# Patient Record
Sex: MALE | Race: WHITE | NOT HISPANIC OR LATINO
[De-identification: names, ages, dates, MRNs, and addresses within clinical notes are randomized per-mention and may not be internally consistent; named-entity substitution may affect disease eponyms.]

---

## 2019-07-03 PROBLEM — Z00.00 ENCOUNTER FOR PREVENTIVE HEALTH EXAMINATION: Status: ACTIVE | Noted: 2019-07-03

## 2019-07-09 ENCOUNTER — APPOINTMENT (OUTPATIENT)
Dept: OTOLARYNGOLOGY | Facility: CLINIC | Age: 34
End: 2019-07-09
Payer: COMMERCIAL

## 2019-07-09 DIAGNOSIS — Q38.5 CONGENITAL MALFORMATIONS OF PALATE, NOT ELSEWHERE CLASSIFIED: ICD-10-CM

## 2019-07-09 DIAGNOSIS — Z87.891 PERSONAL HISTORY OF NICOTINE DEPENDENCE: ICD-10-CM

## 2019-07-09 PROCEDURE — 31575 DIAGNOSTIC LARYNGOSCOPY: CPT

## 2019-07-09 PROCEDURE — 99203 OFFICE O/P NEW LOW 30 MIN: CPT | Mod: 25

## 2019-07-09 NOTE — ASSESSMENT
[FreeTextEntry1] : Clinically stable. There is no evidence of the lesion.\par I suspect that he had some pelvic trauma that has resolved.\par A low grade dysphagia may be either from globus pharyngeus, L. SD or psychosomatic. I reassured them I did not see any evidence of a lesion.\par \par As for the weight loss that may be unexplained I did explain him if this persists she needs to be evaluated by his primary care physician for comprehensive exam.\par \par I did once again recommend smoking cessation.

## 2019-07-09 NOTE — PROCEDURE
[de-identified] : PROCEDURE: Flexible laryngoscopy\par SURGEON: Dr. Rebolledo\par INDICATIONS: He was unable to tolerate a mirror exam. Assess for laryngopharynx pharyngeal reflux. cough. head and neck mass. \par ANESTHESIA: The patient was placed in a sitting position.  Following application of the topical anesthetic and decongestant, exam was performed with a flexible scope.  The scope was passed along the right nasal floor to the nasopharynx.  It was then passed into the region of the middle meatus, middle turbinate, and sphenoethmoid region.  An identical procedure was performed on the left side.  The following findings were noted:\par \par The nasal musoca was healthy appearing and the septum was roughly midline. The middle meatus and sphenoethmoid recesses were clear bilaterally. The nasopharynx \par \par Nasopharynx: no masses, choanae patent, no adenoid tissue\par \par Base of tongue/vallecula: no masses or asymmetry\par Pharyngeal walls: symmetrical. No masses\par Pyriform sinuses: no lesions or pooling of secretions\par Epiglottis: normal. No edema or lesions\par Aryepiglottic folds: normal. No lesions. \par Vocal cords: clear and mobile. No lesions. Airway patent\par Arytenoids: no edema or erythema \par Interarytenoid area: no edema, erythema or lesion.\par

## 2019-07-09 NOTE — HISTORY OF PRESENT ILLNESS
[de-identified] : This is an initial office visit for this gentleman who is concern for oral cavity and throat pathology.\par He reports that he is an 8 "social smoker" for the last 15 years.\par Approximately 3 weeks ago he began using E. cigarette. After using it once he developed very significant pelvic pain. He also describe seeing some lesions. Those have resolved.\par He does continue to complain of a low-grade dysphasia and a sore throat.\par He has some concern that he may have a malignancy.\par \par He also reports that he is noticed a 1 pound weight loss each day for the last 4 days. He does however feel that this is because he is eating healthier.

## 2020-08-13 ENCOUNTER — APPOINTMENT (OUTPATIENT)
Dept: OTOLARYNGOLOGY | Facility: CLINIC | Age: 35
End: 2020-08-13
Payer: COMMERCIAL

## 2020-08-13 VITALS — TEMPERATURE: 97.7 F | BODY MASS INDEX: 26.46 KG/M2 | WEIGHT: 189 LBS | HEIGHT: 71 IN

## 2020-08-13 DIAGNOSIS — J34.2 DEVIATED NASAL SEPTUM: ICD-10-CM

## 2020-08-13 DIAGNOSIS — Z87.09 PERSONAL HISTORY OF OTHER DISEASES OF THE RESPIRATORY SYSTEM: ICD-10-CM

## 2020-08-13 DIAGNOSIS — K21.9 GASTRO-ESOPHAGEAL REFLUX DISEASE W/OUT ESOPHAGITIS: ICD-10-CM

## 2020-08-13 DIAGNOSIS — J31.0 CHRONIC RHINITIS: ICD-10-CM

## 2020-08-13 PROCEDURE — 99214 OFFICE O/P EST MOD 30 MIN: CPT | Mod: 25

## 2020-08-13 PROCEDURE — 31231 NASAL ENDOSCOPY DX: CPT

## 2020-08-13 NOTE — ASSESSMENT
[FreeTextEntry1] : It was my impression that he has a rhinitis and the septal deflection. The option of further intervention was discussed but not necessarily recommended.\par \par It was my impression is that the patient's symptoms were from laryngeal evidence of reflux.  I reviewed an anti-reflux diet at length with the patient.  I recommended a course of famotidine 40 mg at bedtime.  I suggested a repeat evaluation in 4-6 weeks to make sure that the patient is improving.  If not I would recommend further evaluation including possibly pH testing, PPI therapy and/or further GI evaluation.\par He had an upper GI evaluation for this a couple of years ago after which she was treated with Prilosec and had symptomatic improvement

## 2020-08-13 NOTE — PHYSICAL EXAM
[FreeTextEntry1] : \par The patient was alert and oriented and in no distress.\par Voice was clear.\par \par Face:\par The patient had no facial asymmetry or mass.\par The skin was unremarkable.\par \par Eyes:\par The pupils were equal round and reactive to light and accommodation.\par There was no significant nystagmus or disconjugate gaze noted.\par \par Nose: \par The external nose had no significant deformity.  There was no facial tenderness.  On anterior rhinoscopy, the nasal mucosa was clear.  The anterior septum was midline.  There were no visualized polyps purulence  or masses.\par \par Oral cavity:\par The oral mucosa was normal.\par The oral and base of tongue were clear and without mass.\par The gingival and buccal mucosa were moist and without lesions.\par The palate moved well.\par There was no cleft to the palate.\par There appeared to be good salivary flow.  \par There was no pus, erythema or mass in the oral cavity.\par \par He has 3+ noninjected tonsils\par \par \par Ears:\par The external ears were normal without deformity.\par The ear canals were clear.\par The tympanic membranes were intact and normal.\par \par Neck: \par The neck was symmetrical.\par The parotid and submandibular glands were normal without masses.\par The trachea was midline and there was no unusual crepitus.\par The thyroid was smooth and nontender and no masses were palpated.\par There was no significant cervical adenopathy.\par \par \par Neuro:\par Neurologically, the patient was awake, alert, and oriented to person, place and time. There were no obvious focal neurologic abnormalities.  Cranial nerves II through XII were grossly intact.\par \par \par TMJ:\par The temporomandibular joints were nontender.\par There was no abnormal crepitus and no significant malocclusion\par \par  [de-identified] : The area of his maximal discomfort is the posterior page of the right-sided thyroid cartilage at the level of the arytenoids.\par \par Nasal endoscopy: \par CPT 31836\par Procedure Note:\par \par Endoscopy was done with Covid precautions and with video. All risks and benefits were discussed with the patient and consent obtained.\par \par Nasal endoscopy was done with topical anesthesia of Pontocaine and Afrin and a      nasal endoscope.\par Indication: Nasal congestion, rule out sinusitis.\par Procedure: The nasal cavity was anesthetized with topical Afrin and Pontocaine. An  endoscope was used and inserted into the nasal cavity.\par Attention was first paid to the anterior nasal cavity.\par Endocoscopy was performed to inspect the interior of the nasal cavity, the nasal septum,  the middle and superior meati, the inferior, middle and superior turbinates, and the spheno-ethmoidal  recesses, the nasopharynx and eustachian tube orifices bilaterally. \par All findings were normal except:\par \par Mucosa slightly boggy with an S-shaped deflection.\par \par Flexible fiberoptic laryngoscopy: CPT 65102\par Indications: Dysphagia\par Procedure note:\par \par Flexible fiberoptic laryngoscopy was performed because of dysphagia and because of the patient's inability to tolerate adequate mirror examination.\par \par The nasal cavity was anesthetized with Pontocaine and Afrin.\par The flexible endoscope was placed into the patient's nasal cavity.\par The nasopharynx was without masses.\par The oropharynx, vallecula and base of tongue had no masses.\par The epiglottis, aryepiglottic folds and false vocal cords were normal.\par The pyriform sinuses were without mucosal lesions or pooling of secretions.  \par The laryngeal ventricles were without lesions.\par The visualized subglottis was without mass.\par The lateral and posterior pharyngeal walls were clear and symmetrical.\par The vocal folds were clear and mobile; they abducted and adducted normally.\par There was no interarytenoid mass or fullness.\par \par Endoscopy was done with Covid precautions and with video. All risks and benefits were discussed with the patient and consent obtained.\par \par There was interarytenoid erythema, worse on the right arytenoid which was the area of his discomfort

## 2020-08-13 NOTE — HISTORY OF PRESENT ILLNESS
[de-identified] : NO GARCIA is a 34 year old male who comes in complaining of 8 days of a right-sided persistent sore throat. This started while he was on vacation and he was drinking more than usual. He notes a little bit of hoarseness he does use his voice quite a bit. He denies significant reflux.   The patient had no other ear nose or throat complaints at this visit.

## 2020-12-23 PROBLEM — Z87.09 HISTORY OF SORE THROAT: Status: RESOLVED | Noted: 2019-07-09 | Resolved: 2020-12-23

## 2021-10-28 ENCOUNTER — NON-APPOINTMENT (OUTPATIENT)
Age: 36
End: 2021-10-28

## 2021-11-01 ENCOUNTER — APPOINTMENT (OUTPATIENT)
Dept: OTOLARYNGOLOGY | Facility: CLINIC | Age: 36
End: 2021-11-01
Payer: COMMERCIAL

## 2021-11-01 VITALS — WEIGHT: 190 LBS | BODY MASS INDEX: 26.6 KG/M2 | TEMPERATURE: 96.9 F | HEIGHT: 71 IN

## 2021-11-01 DIAGNOSIS — H93.293 OTHER ABNORMAL AUDITORY PERCEPTIONS, BILATERAL: ICD-10-CM

## 2021-11-01 DIAGNOSIS — M26.629 ARTHRALGIA OF TEMPOROMANDIBULAR JOINT,: ICD-10-CM

## 2021-11-01 DIAGNOSIS — H93.13 TINNITUS, BILATERAL: ICD-10-CM

## 2021-11-01 PROCEDURE — 99213 OFFICE O/P EST LOW 20 MIN: CPT | Mod: 25

## 2021-11-01 PROCEDURE — 92557 COMPREHENSIVE HEARING TEST: CPT

## 2021-11-01 PROCEDURE — 92550 TYMPANOMETRY & REFLEX THRESH: CPT

## 2021-11-01 PROCEDURE — 92504 EAR MICROSCOPY EXAMINATION: CPT

## 2021-11-01 RX ORDER — FAMOTIDINE 40 MG/1
40 TABLET, FILM COATED ORAL
Qty: 30 | Refills: 5 | Status: DISCONTINUED | COMMUNITY
Start: 2020-08-13 | End: 2021-11-01

## 2021-11-01 RX ORDER — APREMILAST 30 MG/1
TABLET, FILM COATED ORAL
Refills: 0 | Status: ACTIVE | COMMUNITY

## 2021-11-01 RX ORDER — METHYLPREDNISOLONE 4 MG/1
4 TABLET ORAL
Qty: 21 | Refills: 0 | Status: DISCONTINUED | COMMUNITY
Start: 2021-07-22

## 2021-11-01 NOTE — DATA REVIEWED
[de-identified] : In order to investigate current symptoms, Complete audiometry was ordered and completed today. I have interpreted these results and reviewed them in detail with the patient.\par \par

## 2021-11-01 NOTE — ASSESSMENT
[FreeTextEntry1] : Mild tinnitus, may be related to asymmetric. Hearing levels.  I have reviewed the audiometry in detail with the patient which reveals a slight hearing loss in the left ear.\par \par Noise protection, recommended. Monitoring recommended with repeat testing in 12 months and as needed . If changes occur. Either hygiene reviewed.\par \par Symptoms of bruxism and headache in the recent past, consistent with TMJ dysfunction. I have suggested an oral surgery consultation.

## 2021-11-01 NOTE — HISTORY OF PRESENT ILLNESS
[de-identified] : NO GARCIA has a history of intermittent tinnitus in both ear and expresses sensitivity to loud noises.  Wave or flutter sensation for a few seconds randomly noted.  There is a history of bruxism with jaw sensitivity, and headache.\par \par Denies noise exposure. \par

## 2024-01-18 ENCOUNTER — APPOINTMENT (OUTPATIENT)
Dept: OTOLARYNGOLOGY | Facility: CLINIC | Age: 39
End: 2024-01-18

## 2024-01-23 ENCOUNTER — APPOINTMENT (OUTPATIENT)
Dept: OTOLARYNGOLOGY | Facility: CLINIC | Age: 39
End: 2024-01-23
Payer: COMMERCIAL

## 2024-01-23 DIAGNOSIS — R43.8 OTHER DISTURBANCES OF SMELL AND TASTE: ICD-10-CM

## 2024-01-23 DIAGNOSIS — R07.0 PAIN IN THROAT: ICD-10-CM

## 2024-01-23 PROCEDURE — 31575 DIAGNOSTIC LARYNGOSCOPY: CPT

## 2024-01-23 PROCEDURE — 99203 OFFICE O/P NEW LOW 30 MIN: CPT | Mod: 25

## 2024-01-23 PROCEDURE — 99213 OFFICE O/P EST LOW 20 MIN: CPT | Mod: 25

## 2024-01-23 NOTE — PHYSICAL EXAM
[TextEntry] : PHYSICAL EXAM  General: The patient was alert and oriented and in no distress. Voice was clear.  Eyes: The patient was alert, oriented and in no distress. Voice was clear.  Eyes: Ocular motility normal. No proptosis. Conjunctiva normal. Sclera white. There was no significant nystagmus or disconjugate gaze noted.  Face: The patient had no facial asymmetry or mass. The skin was unremarkable.  Ears: External ears were normal without deformity. Ear canals were clear Tympanic membranes were intact and normal. No perforation or effusion  Nose:  The external nose had no significant deformity.  There was no facial tenderness.  On anterior rhinoscopy, the nasal mucosa was healthy in appearance. The anterior septum was midline.  There were no visualized polyps purulence  or masses. See procedure note for endonasal exam.  Oral cavity: Oral mucosa- normal. Oral and base of tongue- clear and without mass. Gingival and buccal mucosa- moist and without lesions. Palate- the palate moved well. There was no cleft palate. There appeared to be good salivary flow.   Oral cavity/oropharynx- no pus, erythema or mass.  Neck:  The neck was symmetrical. The parotid and submandibular glands were normal without masses. The trachea was midline and there was no unusual crepitus. Thyroid was smooth and nontender and no masses were palpated. Cervical adenopathy- none.

## 2024-01-23 NOTE — ASSESSMENT
[FreeTextEntry1] : No lesion or infection. Likely has LPR particularly in light of previous diagnosis. Patient education material provided. Follow-up if the problem persist or progresses.

## 2024-01-23 NOTE — PROCEDURE
[de-identified] : PROCEDURE: Flexible laryngoscopy SURGEON: Dr. Rebolledo INDICATIONS: He was unable to tolerate a mirror exam. Assess for laryngopharynx pharyngeal reflux. cough. head and neck mass.  ANESTHESIA: The patient was placed in a sitting position.  Following application of the topical anesthetic and decongestant, exam was performed with a flexible scope.  The scope was passed along the right nasal floor to the nasopharynx.  It was then passed into the region of the middle meatus, middle turbinate, and sphenoethmoid region.  An identical procedure was performed on the left side.  The following findings were noted:  The nasal musoca was healthy appearing and the septum was roughly midline. The middle meatus and sphenoethmoid recesses were clear bilaterally. The nasopharynx   Nasopharynx: no masses, choanae patent, no adenoid tissue  Base of tongue/vallecula: no masses or asymmetry Pharyngeal walls: symmetrical. No masses. Pyriform sinuses: no lesions or pooling of secretions. Epiglottis: normal. No edema or lesions. Aryepiglottic folds: normal. No lesions.  Vocal cords: clear and mobile. No lesions. Airway patent. Arytenoids: no edema or erythema.  Interarytenoid area: no edema, erythema or lesion.

## 2024-08-23 NOTE — HISTORY OF PRESENT ILLNESS
[de-identified] : Initial visit. He reports that he was in his otherwise state of fine health when he had a recent upper respiratory illness.  He developed a metallic taste as he described.  The illness ran its course and the metallic taste resolved. He never saw bright red blood per oral cavity. He does report a history of smoking he quit 5 years ago.  He then noticed what he describes as some low-grade intermittent discomfort in his left throat. No associated unexplained weight loss, hemoptysis or dysphagia.  He had been diagnosed with LPR in the past.
No